# Patient Record
Sex: MALE | Race: WHITE | Employment: UNEMPLOYED | URBAN - METROPOLITAN AREA
[De-identification: names, ages, dates, MRNs, and addresses within clinical notes are randomized per-mention and may not be internally consistent; named-entity substitution may affect disease eponyms.]

---

## 2017-01-12 ENCOUNTER — OFFICE VISIT (OUTPATIENT)
Dept: FAMILY MEDICINE CLINIC | Facility: CLINIC | Age: 22
End: 2017-01-12

## 2017-01-12 VITALS
TEMPERATURE: 98 F | WEIGHT: 167 LBS | HEART RATE: 56 BPM | SYSTOLIC BLOOD PRESSURE: 111 MMHG | DIASTOLIC BLOOD PRESSURE: 71 MMHG | BODY MASS INDEX: 21 KG/M2

## 2017-01-12 DIAGNOSIS — J06.9 ACUTE URI: Primary | ICD-10-CM

## 2017-01-12 PROCEDURE — 99212 OFFICE O/P EST SF 10 MIN: CPT | Performed by: FAMILY MEDICINE

## 2017-01-12 PROCEDURE — 99213 OFFICE O/P EST LOW 20 MIN: CPT | Performed by: FAMILY MEDICINE

## 2017-01-12 RX ORDER — AZITHROMYCIN 250 MG/1
TABLET, FILM COATED ORAL
Qty: 6 TABLET | Refills: 0 | Status: SHIPPED | OUTPATIENT
Start: 2017-01-12 | End: 2018-04-06 | Stop reason: ALTCHOICE

## 2017-01-12 RX ORDER — ALBUTEROL SULFATE 90 UG/1
2 AEROSOL, METERED RESPIRATORY (INHALATION) EVERY 4 HOURS PRN
Qty: 1 INHALER | Refills: 0 | Status: SHIPPED | OUTPATIENT
Start: 2017-01-12 | End: 2018-04-06 | Stop reason: ALTCHOICE

## 2017-01-12 NOTE — PROGRESS NOTES
HPI:    Patient ID: Rupal Evans is a 25year old male. HPI  Patient presents with:  Cough: c/o cough and wheezing for 3 weeks    Review of Systems   Constitutional: Negative. HENT: Positive for congestion. Respiratory: Positive for cough.

## 2017-06-16 ENCOUNTER — OFFICE VISIT (OUTPATIENT)
Dept: OPTOMETRY | Facility: CLINIC | Age: 22
End: 2017-06-16

## 2017-06-16 DIAGNOSIS — H52.13 MYOPIA, BILATERAL: Primary | ICD-10-CM

## 2017-06-16 PROCEDURE — 92012 INTRM OPH EXAM EST PATIENT: CPT | Performed by: OPTOMETRIST

## 2017-06-16 PROCEDURE — 92015 DETERMINE REFRACTIVE STATE: CPT | Performed by: OPTOMETRIST

## 2017-06-16 NOTE — PROGRESS NOTES
Cleo Seen is a 23year old male. HPI:     HPI     Patient is in for an annual contact lens exam. Patient is happy with his Godfrey Proclear. Disposes of after one month of daily wear.        Last edited by Julienne Tripathi OD on 6/16/2017  8:16 AM. Near cc 4pt 4pt       Correction:  Glasses      Tonometry (Non-contact air puff, 8:24 AM)      Right Left   Pressure 20 19         Pupils      Pupils   Right PERRL   Left PERRL         Visual Fields      Left Right   Result Full Full         Extraocular prescriptions requested or ordered in this encounter     Follow up instructions:  Return for Eye Exam, contact lens check.     6/16/2017  Scribed by: Arely العلي

## 2017-08-10 ENCOUNTER — TELEPHONE (OUTPATIENT)
Dept: OPHTHALMOLOGY | Facility: CLINIC | Age: 22
End: 2017-08-10

## 2018-04-06 NOTE — PROGRESS NOTES
HPI:    Patient ID: Jamaal Waterman is a 23year old male. HPI  Patient presents with:  ADHD: pt would like to start medication, H/O ADHD  Knee Pain: c/o right knee pain for 2 weeks    Review of Systems   Constitutional: Negative.     Musculoskeletal: Po

## 2018-05-04 NOTE — PROGRESS NOTES
HPI:    Patient ID: Caryle Breeze is a 23year old male. HPI  Patient presents with: Follow - Up: Here today for 1 month follow up on ADD. Pt feels benefits do not outweigh the reactions he has been feeling such as increase anxiety.  Pt would like to

## 2018-06-18 ENCOUNTER — TELEPHONE (OUTPATIENT)
Dept: OPTOMETRY | Facility: CLINIC | Age: 23
End: 2018-06-18

## 2018-06-18 NOTE — TELEPHONE ENCOUNTER
pt called. LOV 6/16/17 with PPS. He made an appt for 7/19/18. Can he have enough contacts until this appt? Please advise  He can  the RX. Thank you.

## 2018-06-19 NOTE — TELEPHONE ENCOUNTER
Pt called stating he would like to  the contact rx. Tonight if possible or one night this week. Will someone be at the desk?   Please call pt to advise

## 2018-07-19 ENCOUNTER — OFFICE VISIT (OUTPATIENT)
Dept: OPTOMETRY | Facility: CLINIC | Age: 23
End: 2018-07-19
Payer: COMMERCIAL

## 2018-07-19 DIAGNOSIS — H52.13 MYOPIA OF BOTH EYES: Primary | ICD-10-CM

## 2018-07-19 PROCEDURE — 92015 DETERMINE REFRACTIVE STATE: CPT | Performed by: OPTOMETRIST

## 2018-07-19 PROCEDURE — 92012 INTRM OPH EXAM EST PATIENT: CPT | Performed by: OPTOMETRIST

## 2018-07-19 RX ORDER — DEXMETHYLPHENIDATE HYDROCHLORIDE 5 MG/1
5 CAPSULE, EXTENDED RELEASE ORAL DAILY
Refills: 0 | COMMUNITY
Start: 2018-05-04 | End: 2019-06-27

## 2018-07-20 NOTE — PROGRESS NOTES
Trinh Webster is a 26year old male. HPI:     HPI     Patient is in for an annual contact lens exam. States that his vision is not as clear in the left eye as the right. Patient is happy with his Godfrey Proclear lenses.  Disposes of after 1 month of da Extraocular Movement       Right Left     Full, Ortho Full, Ortho          Neuro/Psych     Oriented x3:  Yes    Mood/Affect:  Normal            Additional Tests     Amsler       Right Left     Normal Normal            Slit Lamp and Fundus Exam     External

## 2018-11-09 ENCOUNTER — OFFICE VISIT (OUTPATIENT)
Dept: INTERNAL MEDICINE CLINIC | Facility: CLINIC | Age: 23
End: 2018-11-09
Payer: COMMERCIAL

## 2018-11-09 VITALS
WEIGHT: 174 LBS | HEART RATE: 48 BPM | DIASTOLIC BLOOD PRESSURE: 80 MMHG | TEMPERATURE: 98 F | HEIGHT: 74.25 IN | SYSTOLIC BLOOD PRESSURE: 120 MMHG | BODY MASS INDEX: 22.1 KG/M2

## 2018-11-09 DIAGNOSIS — Z00.00 PHYSICAL EXAM: Primary | ICD-10-CM

## 2018-11-09 DIAGNOSIS — H18.823 DISORDER OF BOTH CORNEAS DUE TO CONTACT LENS: ICD-10-CM

## 2018-11-09 DIAGNOSIS — Z23 NEED FOR VACCINATION: ICD-10-CM

## 2018-11-09 DIAGNOSIS — H52.13 MYOPIA OF BOTH EYES: ICD-10-CM

## 2018-11-09 DIAGNOSIS — F90.0 ATTENTION DEFICIT HYPERACTIVITY DISORDER (ADHD), PREDOMINANTLY INATTENTIVE TYPE: ICD-10-CM

## 2018-11-09 PROCEDURE — 90686 IIV4 VACC NO PRSV 0.5 ML IM: CPT | Performed by: INTERNAL MEDICINE

## 2018-11-09 PROCEDURE — 99395 PREV VISIT EST AGE 18-39: CPT | Performed by: INTERNAL MEDICINE

## 2018-11-09 PROCEDURE — 93005 ELECTROCARDIOGRAM TRACING: CPT | Performed by: INTERNAL MEDICINE

## 2018-11-09 PROCEDURE — 93000 ELECTROCARDIOGRAM COMPLETE: CPT | Performed by: INTERNAL MEDICINE

## 2018-11-09 PROCEDURE — 90471 IMMUNIZATION ADMIN: CPT | Performed by: INTERNAL MEDICINE

## 2018-11-09 NOTE — PATIENT INSTRUCTIONS
1. Physical exam  Physical exam instruction: Improve diet and exercise, complete fasting labs in the near future and you will be called with results 5-7 days after completed, call with questions. - CBC WITH DIFFERENTIAL WITH PLATELET;  Future  - COMP MET

## 2018-11-09 NOTE — PROGRESS NOTES
Sakina Ha is a 26year old male who presents for a complete physical exam.   HPI:   Pt complains of:    Patient presents with:  Physical: establish care, [facility] and hopeful to [address], one med for ADHD spectrim, diet average, etoh social, exe heartbeat/palpitations. GI: Negative for Abdominal pain, constipation, diarrhea, heartburn, nausea and vomiting. : Negative for Dysuria and urinary frequency. Endocrine: Negative for Cold intolerance and heat intolerance.   Neuro: Negative for Gait dis Future  - URINALYSIS WITH CULTURE REFLEX  - VITAMIN D, 25-HYDROXY; Future  - VITAMIN B12; Future  - MAGNESIUM; Future  EKG: Sinus rhythm/bradycardia 48 bpm, no ST-T wave changes, normal EKG with the exception of rate    2.  Attention deficit hyperactivity d

## 2019-02-04 ENCOUNTER — LAB ENCOUNTER (OUTPATIENT)
Dept: LAB | Age: 24
End: 2019-02-04
Attending: INTERNAL MEDICINE
Payer: COMMERCIAL

## 2019-02-04 DIAGNOSIS — Z00.00 PHYSICAL EXAM: ICD-10-CM

## 2019-02-04 LAB
25(OH)D3 SERPL-MCNC: 22.6 NG/ML (ref 30–100)
ALBUMIN SERPL BCP-MCNC: 4.4 G/DL (ref 3.5–4.8)
ALBUMIN/GLOB SERPL: 1.6 {RATIO} (ref 1–2)
ALP SERPL-CCNC: 59 U/L (ref 32–100)
ALT SERPL-CCNC: 21 U/L (ref 17–63)
ANION GAP SERPL CALC-SCNC: 11 MMOL/L (ref 0–18)
AST SERPL-CCNC: 26 U/L (ref 15–41)
BASOPHILS # BLD AUTO: 0.04 X10(3) UL (ref 0–0.2)
BASOPHILS NFR BLD AUTO: 1 %
BILIRUB SERPL-MCNC: 1.5 MG/DL (ref 0.3–1.2)
BILIRUB UR QL: NEGATIVE
BUN SERPL-MCNC: 12 MG/DL (ref 8–20)
BUN/CREAT SERPL: 12.2 (ref 10–20)
CALCIUM SERPL-MCNC: 9.7 MG/DL (ref 8.5–10.5)
CHLORIDE SERPL-SCNC: 99 MMOL/L (ref 95–110)
CHOLEST SERPL-MCNC: 179 MG/DL (ref 110–200)
CLARITY UR: CLEAR
CO2 SERPL-SCNC: 27 MMOL/L (ref 22–32)
COLOR UR: YELLOW
CREAT SERPL-MCNC: 0.98 MG/DL (ref 0.5–1.5)
DEPRECATED RDW RBC AUTO: 39.7 FL (ref 35.1–46.3)
EOSINOPHIL # BLD AUTO: 0.1 X10(3) UL (ref 0–0.7)
EOSINOPHIL NFR BLD AUTO: 2.5 %
ERYTHROCYTE [DISTWIDTH] IN BLOOD BY AUTOMATED COUNT: 12.1 % (ref 11–15)
GLOBULIN PLAS-MCNC: 2.7 G/DL (ref 2.5–3.7)
GLUCOSE SERPL-MCNC: 94 MG/DL (ref 70–99)
GLUCOSE UR-MCNC: NEGATIVE MG/DL
HCT VFR BLD AUTO: 45.8 % (ref 39–53)
HDLC SERPL-MCNC: 84 MG/DL
HGB BLD-MCNC: 15.4 G/DL (ref 13–17.5)
HGB UR QL STRIP.AUTO: NEGATIVE
IMM GRANULOCYTES # BLD AUTO: 0.01 X10(3) UL (ref 0–1)
IMM GRANULOCYTES NFR BLD: 0.2 %
KETONES UR-MCNC: NEGATIVE MG/DL
LDLC SERPL CALC-MCNC: 85 MG/DL (ref 0–99)
LEUKOCYTE ESTERASE UR QL STRIP.AUTO: NEGATIVE
LYMPHOCYTES # BLD AUTO: 1.58 X10(3) UL (ref 1–4)
LYMPHOCYTES NFR BLD AUTO: 39.4 %
MAGNESIUM SERPL-MCNC: 2 MG/DL (ref 1.8–2.5)
MCH RBC QN AUTO: 30.1 PG (ref 26–34)
MCHC RBC AUTO-ENTMCNC: 33.6 G/DL (ref 31–37)
MCV RBC AUTO: 89.5 FL (ref 80–100)
MONOCYTES # BLD AUTO: 0.43 X10(3) UL (ref 0.1–1)
MONOCYTES NFR BLD AUTO: 10.7 %
NEUTROPHILS # BLD AUTO: 1.85 X10 (3) UL (ref 1.5–7.7)
NEUTROPHILS # BLD AUTO: 1.85 X10(3) UL (ref 1.5–7.7)
NEUTROPHILS NFR BLD AUTO: 46.2 %
NITRITE UR QL STRIP.AUTO: NEGATIVE
NONHDLC SERPL-MCNC: 95 MG/DL
OSMOLALITY UR CALC.SUM OF ELEC: 284 MOSM/KG (ref 275–295)
PATIENT FASTING: YES
PH UR: 7 [PH] (ref 5–8)
PLATELET # BLD AUTO: 242 10(3)UL (ref 150–450)
POTASSIUM SERPL-SCNC: 4.6 MMOL/L (ref 3.3–5.1)
PROT SERPL-MCNC: 7.1 G/DL (ref 5.9–8.4)
PROT UR-MCNC: NEGATIVE MG/DL
RBC # BLD AUTO: 5.12 X10(6)UL (ref 4.3–5.7)
SODIUM SERPL-SCNC: 137 MMOL/L (ref 136–144)
SP GR UR STRIP: 1.02 (ref 1–1.03)
TRIGL SERPL-MCNC: 49 MG/DL (ref 1–149)
TSH SERPL-ACNC: 1.52 UIU/ML (ref 0.45–5.33)
UROBILINOGEN UR STRIP-ACNC: <2
VIT B12 SERPL-MCNC: 406 PG/ML (ref 181–914)
VIT C UR-MCNC: NEGATIVE MG/DL
WBC # BLD AUTO: 4 X10(3) UL (ref 4–11)

## 2019-02-04 PROCEDURE — 81003 URINALYSIS AUTO W/O SCOPE: CPT | Performed by: INTERNAL MEDICINE

## 2019-02-04 PROCEDURE — 80053 COMPREHEN METABOLIC PANEL: CPT

## 2019-02-04 PROCEDURE — 84443 ASSAY THYROID STIM HORMONE: CPT

## 2019-02-04 PROCEDURE — 36415 COLL VENOUS BLD VENIPUNCTURE: CPT

## 2019-02-04 PROCEDURE — 82306 VITAMIN D 25 HYDROXY: CPT

## 2019-02-04 PROCEDURE — 80061 LIPID PANEL: CPT

## 2019-02-04 PROCEDURE — 82607 VITAMIN B-12: CPT

## 2019-02-04 PROCEDURE — 85025 COMPLETE CBC W/AUTO DIFF WBC: CPT

## 2019-02-04 PROCEDURE — 83735 ASSAY OF MAGNESIUM: CPT

## 2019-02-15 ENCOUNTER — TELEPHONE (OUTPATIENT)
Dept: OPTOMETRY | Facility: CLINIC | Age: 24
End: 2019-02-15

## 2019-02-18 ENCOUNTER — TELEPHONE (OUTPATIENT)
Dept: INTERNAL MEDICINE CLINIC | Facility: CLINIC | Age: 24
End: 2019-02-18

## 2019-02-18 NOTE — TELEPHONE ENCOUNTER
LMTCB    Per [facility], pt received [employer] message 2/15/19 regarding lab results; RN to verify if pt received [employer] message.

## 2019-02-18 NOTE — TELEPHONE ENCOUNTER
Pt asked that Dr Tracye Adams call him to discuss results of recent labs and next steps  Tasked to nursing

## 2019-02-18 NOTE — TELEPHONE ENCOUNTER
Left lens is especially dry. He wears a monthly replacement Proclear. I will get a trial pair of Acuvue Oasys 14.0/8.8 for patient to try.

## 2019-02-19 ENCOUNTER — PATIENT MESSAGE (OUTPATIENT)
Dept: INTERNAL MEDICINE CLINIC | Facility: CLINIC | Age: 24
End: 2019-02-19

## 2019-02-20 NOTE — TELEPHONE ENCOUNTER
Information for [employer] mailed to patient .  [facility] message written with Information about Antione Harding

## 2019-02-20 NOTE — TELEPHONE ENCOUNTER
From: Art Dominguez  To: Miriam Meza DO  Sent: 2/19/2019 8:49 PM CST  Subject: Visit Melita Santi Urrutia,    Could you please send me the contact information/referral for the [facility] group once and Deshawn Cardenas more?   I no

## 2019-02-22 NOTE — TELEPHONE ENCOUNTER
Called patient who received  [facility] message with lab results and [employer] information

## 2019-05-06 ENCOUNTER — TELEPHONE (OUTPATIENT)
Dept: OPHTHALMOLOGY | Facility: CLINIC | Age: 24
End: 2019-05-06

## 2019-05-09 ENCOUNTER — TELEPHONE (OUTPATIENT)
Dept: INTERNAL MEDICINE CLINIC | Facility: CLINIC | Age: 24
End: 2019-05-09

## 2019-05-09 RX ORDER — CLOTRIMAZOLE AND BETAMETHASONE DIPROPIONATE 10; .64 MG/G; MG/G
1 CREAM TOPICAL 2 TIMES DAILY
Qty: 45 G | Refills: 0 | Status: SHIPPED | OUTPATIENT
Start: 2019-05-09 | End: 2019-05-23

## 2019-05-09 NOTE — TELEPHONE ENCOUNTER
Pt requesting in apt with  possible Monday after 1pm he is not sure what it is but looks like a ring worm on his thigh.

## 2019-05-09 NOTE — TELEPHONE ENCOUNTER
Spoke with patient and relayed message from Dr. Jhon Yen. Patient verbalized understanding of instructions and agreement with plan. Pharmacy verified. Med eRx'd.

## 2019-05-09 NOTE — TELEPHONE ENCOUNTER
Spoke with patient. He reports two small rash spots to L thigh about the size of an eraser head each. He reports he has had rash x about 1 month.  Redness and size improved with old antifungal cream (leftover from when he was in high school and had a simila

## 2019-06-27 PROBLEM — M76.891 HAMSTRING TENDONITIS OF RIGHT THIGH: Status: ACTIVE | Noted: 2019-06-27

## 2019-06-27 PROBLEM — F33.8 SEASONAL DEPRESSION (HCC): Status: ACTIVE | Noted: 2019-06-27

## 2019-06-27 PROBLEM — F41.1 GENERALIZED ANXIETY DISORDER: Status: ACTIVE | Noted: 2019-06-27

## 2019-06-27 NOTE — PATIENT INSTRUCTIONS
1. Generalized anxiety disorder  Cont meds and management, stay with the specialists, stay with the every 3-month visits for now, and see me in 6 months for a follow-up,    2.  Seasonal depression ([facility] [address]  No changes here

## 2019-06-27 NOTE — PROGRESS NOTES
HPI:   Daphne Henson is a 27year old male who presents for complains of: Patient presents with: Anxiety: Follow up after [facility] appt. Now on bupropion. Was given propranolol but did not help. Has it on hand to take just as needed.  Pt undiagnosed with

## 2020-01-06 ENCOUNTER — TELEPHONE (OUTPATIENT)
Dept: INTERNAL MEDICINE CLINIC | Facility: CLINIC | Age: 25
End: 2020-01-06

## 2020-01-06 NOTE — TELEPHONE ENCOUNTER
Pt is calling today to se if Dr GOOD will \"take over\" his Wellbutrin, Pt states he has talked to Dr GOOD about having him take over his medication the start of the year so pt no longer has to get it from his psychiatrist.     Pt was informed he should set up a

## 2020-01-06 NOTE — TELEPHONE ENCOUNTER
Spoke to patient who thanks Dr. Rodolfo Preston for filling his medication.  I requested he reads off his prescription from the bottle to me so that we send in the correct dosage/form, etc. He reports he does not have it in front of him so he will call back tomorrow with

## 2020-01-06 NOTE — TELEPHONE ENCOUNTER
To Dr. Patten Pay to advise on \"taking over\" medication, thanks! Last OV 6/27/19:  \"1.  Generalized anxiety disorder  Cont meds and management, stay with the specialists, stay with the every 3-month visits for now, and see me in 6 months for a follow-up,\"

## 2020-01-08 NOTE — TELEPHONE ENCOUNTER
Pt. Called back to have Rx sent to [facility] in [address] ph. # [phone] Pt.  Is aware Dr. Alexi Reyes is off today

## 2020-01-09 RX ORDER — BUPROPION HYDROCHLORIDE 300 MG/1
300 TABLET ORAL DAILY
Qty: 30 TABLET | Refills: 5 | Status: SHIPPED | OUTPATIENT
Start: 2020-01-09 | End: 2020-05-21

## 2020-01-09 NOTE — TELEPHONE ENCOUNTER
Mariela---can you please confirm that right medication was pended?   It also looks like a PA will be needed

## 2020-01-22 ENCOUNTER — TELEPHONE (OUTPATIENT)
Dept: INTERNAL MEDICINE CLINIC | Facility: CLINIC | Age: 25
End: 2020-01-22

## 2020-01-22 NOTE — TELEPHONE ENCOUNTER
Pt asked that Dr Rosy Renee please call him   He is having trouble scheduling an appointment with Dr Rosy Renee that works with his school schedule.   He goes to [facility] and [place] the city now  He wanted to talk to Dr Rosy Renee about managing his Bupropion and also about some chronic knee pain  Tasked to nursing for Dr Rosy Renee

## 2020-01-23 NOTE — TELEPHONE ENCOUNTER
To Dr. Aysha Erazo - see below. Pt states bupropion seems to be at the right level. Will you refill without pt being seen? Pt states chronic intermittent R knee pain (back of knee) - has had PT. Has not tried any OTC products. Pt states very difficult to come for appt - pt call transferred to  for appt.

## 2020-05-21 ENCOUNTER — OFFICE VISIT (OUTPATIENT)
Dept: INTERNAL MEDICINE CLINIC | Facility: CLINIC | Age: 25
End: 2020-05-21
Payer: COMMERCIAL

## 2020-05-21 VITALS
BODY MASS INDEX: 22 KG/M2 | SYSTOLIC BLOOD PRESSURE: 122 MMHG | TEMPERATURE: 99 F | DIASTOLIC BLOOD PRESSURE: 88 MMHG | WEIGHT: 170 LBS | HEART RATE: 76 BPM | OXYGEN SATURATION: 99 %

## 2020-05-21 DIAGNOSIS — M76.891 HAMSTRING TENDONITIS OF RIGHT THIGH: ICD-10-CM

## 2020-05-21 DIAGNOSIS — F33.8 SEASONAL DEPRESSION (HCC): ICD-10-CM

## 2020-05-21 DIAGNOSIS — F41.1 GENERALIZED ANXIETY DISORDER: ICD-10-CM

## 2020-05-21 DIAGNOSIS — M25.561 RECURRENT PAIN OF RIGHT KNEE: Primary | ICD-10-CM

## 2020-05-21 DIAGNOSIS — Z00.00 PHYSICAL EXAM: ICD-10-CM

## 2020-05-21 PROCEDURE — 3079F DIAST BP 80-89 MM HG: CPT | Performed by: INTERNAL MEDICINE

## 2020-05-21 PROCEDURE — 3074F SYST BP LT 130 MM HG: CPT | Performed by: INTERNAL MEDICINE

## 2020-05-21 PROCEDURE — 99395 PREV VISIT EST AGE 18-39: CPT | Performed by: INTERNAL MEDICINE

## 2020-05-21 RX ORDER — BUPROPION HYDROCHLORIDE 300 MG/1
300 TABLET ORAL DAILY
Qty: 30 TABLET | Refills: 5 | Status: SHIPPED | OUTPATIENT
Start: 2020-05-21 | End: 2020-10-27

## 2020-05-21 NOTE — PATIENT INSTRUCTIONS
1. Recurrent pain of right knee  Ok to get back to the PT moves and back to working out  R knee xr if it still bothers you in a few weeks  Okay to use any anti-inflammatories if it is acting up  Remember possibly wearing the neoprene knee sleeve to keep th

## 2020-05-21 NOTE — PROGRESS NOTES
Nidia Reynaga is a 27year old male who presents for a complete physical exam.   HPI:   Pt complains of:    Patient presents with:  Knee Pain: Pt presents for right back of knee pain. Injured it 4 years ago, had MRI and PT at that time.  Recently was isabel fever, malaise, weight gain and weight loss. ENMT: Negative for Nasal drainage and sinus pressure. Eyes: Negative for Vision changes. Respiratory: Negative for Cough, dyspnea and wheezing.   Cardio: Negative Chest pain and irregular heartbeat/palpitation on the right side as compared to the left, no palpable pain, good musculature, well-balanced VMO    ASSESSMENT AND PLAN:   Jamaal Waterman is a 27year old male who presents for a complete physical exam.  1. Recurrent pain of right knee  Ok to get back to

## 2020-08-03 ENCOUNTER — TELEPHONE (OUTPATIENT)
Dept: OPTOMETRY | Facility: CLINIC | Age: 25
End: 2020-08-03

## 2020-08-03 NOTE — TELEPHONE ENCOUNTER
Patient has a small stye on his JAYSON for the past 3 days. I recommend WC to both lids in the AM to clean oil and debris and then WC to the left eye 5-6 times a day until it resolves. Wearing glasses recommended. NO contacts until resolved.

## 2020-10-21 ENCOUNTER — TELEPHONE (OUTPATIENT)
Dept: INTERNAL MEDICINE CLINIC | Facility: CLINIC | Age: 25
End: 2020-10-21

## 2020-10-21 NOTE — TELEPHONE ENCOUNTER
Attempted to call patient to gain more information on brain fog symptoms. No answer and VM box full so unable to LMTCB. Nursing to try again later.

## 2020-10-21 NOTE — TELEPHONE ENCOUNTER
Spoke to patient who reports \"brain fog\" symptoms include becoming a little more forgetful, \"dont feel very sharp\", not thinking straight, a \"little unmotivated\", difficulty learning some things.  Patient reports he had these symptoms when he initiall

## 2020-10-21 NOTE — TELEPHONE ENCOUNTER
Pt. Called stating he is currently taking generic Welbutrin 300mgs  Tabs and he takes 1 daily. Pt. Is asking if he can increase his dosage?   He is much better than he was when he first saw Dr. Kellie Shah. for this issue, but he states he feels  Some \"brain fog\"

## 2020-10-22 NOTE — TELEPHONE ENCOUNTER
Patient calling for update---PCP out of office indefinitely at this current time.  To on-call physician (Dr. Sujatha Blackburn) to please advise in Dr. Kassi Pereira absence on below message:

## 2020-10-23 NOTE — TELEPHONE ENCOUNTER
Telephone call to patient and situation discussed. Patient feels that he is doing better than he was when he had his initial problem in 2018. Patient feels that he is sleeping a little bit right now compared to how he was feeling.   He realizes that there

## 2020-10-26 NOTE — TELEPHONE ENCOUNTER
[address] [facility] desk, try to reach out to him and get him on my schedule sometime this week virtual visit is the only thing available

## 2020-10-27 NOTE — TELEPHONE ENCOUNTER
Tried to call patient to help him schedule a video visit with Dr Derrick Keita per message below. Patient not answer, mailbox full.      Number called at this time: [phone]

## 2020-10-27 NOTE — PROGRESS NOTES
Virtual Visit/Telephone Note    Cleo Seen verbally consents to a Virtual/Telephone Check-In service on 04/07/20. Patient understands and accepts financial responsibility for any deductible, co-insurance and/or co-pays associated with this service. the behavioral health navigator, lets get you a counselor that can  your mood over the next few weeks as the medications change  We did discuss the possibility of changing over to an SSRI like Cymbalta, as well as a stimulant like Vyvanse together as

## 2021-01-30 ENCOUNTER — TELEPHONE (OUTPATIENT)
Dept: INTERNAL MEDICINE CLINIC | Facility: CLINIC | Age: 26
End: 2021-01-30

## 2021-01-30 DIAGNOSIS — F41.1 GENERALIZED ANXIETY DISORDER: ICD-10-CM

## 2021-02-01 NOTE — TELEPHONE ENCOUNTER
Sami Mosley,  We received a request for bupropion for refill.    Could you message us back on if you are still taking this, how you are taking, and if you have been talking to a counselor since your visit with Alma Boothe?  Thank you,  YOSEPH

## 2021-02-02 ENCOUNTER — OFFICE VISIT (OUTPATIENT)
Dept: OPTOMETRY | Facility: CLINIC | Age: 26
End: 2021-02-02
Payer: COMMERCIAL

## 2021-02-02 DIAGNOSIS — H52.13 MYOPIA OF BOTH EYES: Primary | ICD-10-CM

## 2021-02-02 PROCEDURE — 92012 INTRM OPH EXAM EST PATIENT: CPT | Performed by: OPTOMETRIST

## 2021-02-02 RX ORDER — BUPROPION HYDROCHLORIDE 150 MG/1
450 TABLET ORAL DAILY
COMMUNITY
Start: 2020-12-27 | End: 2021-02-12 | Stop reason: ALTCHOICE

## 2021-02-02 NOTE — PROGRESS NOTES
Omaira Parry is a 23year old male. HPI:     HPI     Patient is in for an annual contact lens exam. He has no complaints with his vision and is happy with his Oasys contact lenses. He broke his current glasses and wants to get an RX for a new pair. Frutoso Sour on 2/2/2021  8:51 AM. (History)          PHYSICAL EXAM:     Base Eye Exam     Visual Acuity (Snellen - Linear)       Right Left    Dist cc 20/20 20/20    Near cc 4pt 4pt    Correction: Contacts          Tonometry ([facility], 8:58 AM)       Right Left and Plan:     Myopia  New glasses and contact lens RX given today. No orders of the defined types were placed in this encounter.       Meds This Visit:  Requested Prescriptions      No prescriptions requested or ordered in this encounter        Follow

## 2021-02-12 ENCOUNTER — TELEPHONE (OUTPATIENT)
Dept: INTERNAL MEDICINE CLINIC | Facility: CLINIC | Age: 26
End: 2021-02-12

## 2021-02-12 RX ORDER — BUPROPION HYDROCHLORIDE 150 MG/1
TABLET ORAL
Qty: 90 TABLET | Refills: 1 | Status: SHIPPED | OUTPATIENT
Start: 2021-02-12 | End: 2021-03-01

## 2021-02-12 NOTE — TELEPHONE ENCOUNTER
To Melo Lemus---    Are you able to assist? Pt called back with the following:  \"              Pt called asking for refill on Wellbutrin  pls send to [facility] in [facility]  Pt wanted to let Dr Zbigniew Atkinson the dosage is working well.   Pt can be reached at [phone]

## 2021-02-12 NOTE — TELEPHONE ENCOUNTER
To  - Bupropion pended for review;   Pt states it's working;  Did not indicate if he has reached out to psych yet

## 2021-02-12 NOTE — TELEPHONE ENCOUNTER
Pt called asking for refill on Wellbutrin  [facility]s [facility]  Pt wanted to let Dr Jian Yao the dosage is working well.   Pt can be reached at [phone]

## 2021-03-01 RX ORDER — BUPROPION HYDROCHLORIDE 150 MG/1
450 TABLET ORAL DAILY
Qty: 90 TABLET | Refills: 1 | Status: SHIPPED | OUTPATIENT
Start: 2021-03-01 | End: 2021-04-28

## 2021-03-01 NOTE — TELEPHONE ENCOUNTER
Spoke with [facility]- they did not receive rx from 2/12/21, last received in October. Resent RX as previously sent by MD.     Patient notified via my chart.

## 2021-03-01 NOTE — TELEPHONE ENCOUNTER
Patient calling, spoke to [place] today. [facility] has not received refill request that was sent 2/12/2021. Patient is completely medication. Please call patient back.   Best call back number [phone]

## 2021-03-29 NOTE — TELEPHONE ENCOUNTER
Pt used the trial contacts he was given and would now like to order more contacts. Does not remember the name, but would like to order the same type as the trial pack. Reviewed with Dr. Rangel. VORB  Dr. Matos:   Alert consistent with PAT/AVNRT. Will continue to monitor at this time. Pt to call if any worsening symptoms.

## 2021-04-19 ENCOUNTER — IMMUNIZATION (OUTPATIENT)
Dept: LAB | Age: 26
End: 2021-04-19
Attending: HOSPITALIST
Payer: COMMERCIAL

## 2021-04-19 DIAGNOSIS — Z23 NEED FOR VACCINATION: Primary | ICD-10-CM

## 2021-04-19 PROCEDURE — 0001A SARSCOV2 VAC 30MCG/0.3ML IM: CPT

## 2021-04-27 DIAGNOSIS — F41.1 GENERALIZED ANXIETY DISORDER: ICD-10-CM

## 2021-04-28 RX ORDER — BUPROPION HYDROCHLORIDE 150 MG/1
TABLET ORAL
Qty: 90 TABLET | Refills: 1 | Status: SHIPPED | OUTPATIENT
Start: 2021-04-28 | End: 2021-08-03

## 2021-05-10 ENCOUNTER — IMMUNIZATION (OUTPATIENT)
Dept: LAB | Age: 26
End: 2021-05-10
Attending: HOSPITALIST
Payer: COMMERCIAL

## 2021-05-10 DIAGNOSIS — Z23 NEED FOR VACCINATION: Primary | ICD-10-CM

## 2021-05-10 PROCEDURE — 0002A SARSCOV2 VAC 30MCG/0.3ML IM: CPT

## 2021-06-02 ENCOUNTER — TELEPHONE (OUTPATIENT)
Dept: INTERNAL MEDICINE CLINIC | Facility: CLINIC | Age: 26
End: 2021-06-02

## 2021-06-02 NOTE — TELEPHONE ENCOUNTER
Please advise - called patient who injured right knee about 8 years ago , he saw DR. Laboy then . Now his knee is still hurting , more lately in the back and exterior right side - pain and burns .  Should he be seen here or shoul dhe see specialist right

## 2021-06-02 NOTE — TELEPHONE ENCOUNTER
Patient is calling stating that his right knee hurts. The back and the exterior right side is what hurts. It hurts and burns. There is constant pain but it hurts more with activity.         He is not sure if he needs to see a specialist.    Please call an

## 2021-06-03 NOTE — TELEPHONE ENCOUNTER
Spoke to pt and relayed MD message and instructions. Pt will contact Dr. Esvin Hirsch' office. Advised to contact us if he needs any assistance, or if he decides that he would like to see Dr. Katy Quiroga first. Pt verbalized understanding and agrees with plan.

## 2021-06-03 NOTE — TELEPHONE ENCOUNTER
Dont know, up to him, but if he does have an established relationship with the specialist he should probably start there.

## 2021-08-03 DIAGNOSIS — F41.1 GENERALIZED ANXIETY DISORDER: ICD-10-CM

## 2021-08-04 NOTE — TELEPHONE ENCOUNTER
Patient was called per request below. Patient answered and was informed he is due for an annual physical with Dr Eamon Rodriguez. Patient verbalized understanding but stated he'd have to call back to schedule at a later time.  Patient was informed that the [facility]

## 2021-08-05 RX ORDER — BUPROPION HYDROCHLORIDE 150 MG/1
450 TABLET ORAL DAILY
Qty: 90 TABLET | Refills: 0 | Status: SHIPPED | OUTPATIENT
Start: 2021-08-05 | End: 2021-09-23

## 2021-08-05 NOTE — TELEPHONE ENCOUNTER
Patient had virtual visit with Babar Baker on 10/27/2020 and was last in office on 5/21/2020.  Refill sent for 30-days     Requested Prescriptions     Signed Prescriptions Disp Refills   • buPROPion 150 MG Oral Tablet 24 Hr 90 tablet 0     Sig: Take 3 tablets

## 2021-08-31 ENCOUNTER — OFFICE VISIT (OUTPATIENT)
Dept: INTERNAL MEDICINE CLINIC | Facility: CLINIC | Age: 26
End: 2021-08-31
Payer: COMMERCIAL

## 2021-08-31 VITALS
TEMPERATURE: 99 F | WEIGHT: 172.38 LBS | SYSTOLIC BLOOD PRESSURE: 120 MMHG | DIASTOLIC BLOOD PRESSURE: 70 MMHG | BODY MASS INDEX: 21.66 KG/M2 | HEART RATE: 87 BPM | HEIGHT: 74.7 IN | OXYGEN SATURATION: 99 %

## 2021-08-31 DIAGNOSIS — F41.1 GENERALIZED ANXIETY DISORDER: ICD-10-CM

## 2021-08-31 DIAGNOSIS — F33.8 SEASONAL DEPRESSION (HCC): ICD-10-CM

## 2021-08-31 DIAGNOSIS — Z00.00 PHYSICAL EXAM: Primary | ICD-10-CM

## 2021-08-31 DIAGNOSIS — M25.561 ACUTE PAIN OF RIGHT KNEE: ICD-10-CM

## 2021-08-31 DIAGNOSIS — H52.13 MYOPIA OF BOTH EYES: ICD-10-CM

## 2021-08-31 PROBLEM — M76.891 HAMSTRING TENDONITIS OF RIGHT THIGH: Status: RESOLVED | Noted: 2019-06-27 | Resolved: 2021-08-31

## 2021-08-31 PROCEDURE — 3008F BODY MASS INDEX DOCD: CPT | Performed by: INTERNAL MEDICINE

## 2021-08-31 PROCEDURE — 3074F SYST BP LT 130 MM HG: CPT | Performed by: INTERNAL MEDICINE

## 2021-08-31 PROCEDURE — 99395 PREV VISIT EST AGE 18-39: CPT | Performed by: INTERNAL MEDICINE

## 2021-08-31 PROCEDURE — 3078F DIAST BP <80 MM HG: CPT | Performed by: INTERNAL MEDICINE

## 2021-08-31 NOTE — PATIENT INSTRUCTIONS
1. Physical exam  Physical exam instruction: Improve diet and exercise, complete fasting labs in the near future and you will be called with results 5-7 days after completed, call with questions.   Call the central scheduling number at [phone] to sched

## 2021-08-31 NOTE — PROGRESS NOTES
Omaira Parry is a 27year old male who presents for a complete physical exam.   HPI:   Pt complains of:    Patient presents with:  Physical: applying for jobs, graduating colleges. diet is good, working part time for extra cash.  EToh is every 2 wks, no loss.  ENMT: Negative for Nasal drainage and sinus pressure. Eyes: Negative for Vision changes. Respiratory: Negative for Cough, dyspnea and wheezing. Cardio: Negative Chest pain and irregular heartbeat/palpitations.   GI: Negative for Abdominal pain, co prostate not examined    ASSESSMENT AND PLAN:   Omaira Parry is a 27year old male who presents for a complete physical exam.  1. Physical exam  Physical exam instruction: Improve diet and exercise, complete fasting labs in the near future and you will

## 2021-09-21 ENCOUNTER — TELEPHONE (OUTPATIENT)
Dept: INTERNAL MEDICINE CLINIC | Facility: CLINIC | Age: 26
End: 2021-09-21

## 2021-09-21 DIAGNOSIS — F41.1 GENERALIZED ANXIETY DISORDER: ICD-10-CM

## 2021-09-21 NOTE — TELEPHONE ENCOUNTER
Patient calling for refill of Bupropion asking if Dr. Mata Ontiveros would give 2 months worth. Completely out of medication.      [employer]

## 2021-09-22 DIAGNOSIS — F41.1 GENERALIZED ANXIETY DISORDER: ICD-10-CM

## 2021-09-23 RX ORDER — BUPROPION HYDROCHLORIDE 150 MG/1
TABLET ORAL
Qty: 90 TABLET | Refills: 0 | Status: SHIPPED | OUTPATIENT
Start: 2021-09-23 | End: 2021-11-04

## 2021-09-23 NOTE — TELEPHONE ENCOUNTER
Pt was recently seen for physical, however overdue for fasting labs. Orders in system. Short fill given see refill encounter.

## 2021-09-23 NOTE — TELEPHONE ENCOUNTER
Pt was recently seen for physical, however overdue for fasting labs. Orders in system. Notation to pharmacy. Short fill given.      Refill request is for a maintenance medication and has met the criteria specified in the Ambulatory Medication Refill Standin

## 2021-10-24 DIAGNOSIS — F41.1 GENERALIZED ANXIETY DISORDER: ICD-10-CM

## 2021-10-28 ENCOUNTER — LAB ENCOUNTER (OUTPATIENT)
Dept: LAB | Age: 26
End: 2021-10-28
Attending: INTERNAL MEDICINE
Payer: COMMERCIAL

## 2021-10-28 DIAGNOSIS — Z00.00 PHYSICAL EXAM: ICD-10-CM

## 2021-10-28 PROCEDURE — 84402 ASSAY OF FREE TESTOSTERONE: CPT

## 2021-10-28 PROCEDURE — 85025 COMPLETE CBC W/AUTO DIFF WBC: CPT

## 2021-10-28 PROCEDURE — 84403 ASSAY OF TOTAL TESTOSTERONE: CPT

## 2021-10-28 PROCEDURE — 84443 ASSAY THYROID STIM HORMONE: CPT

## 2021-10-28 PROCEDURE — 80061 LIPID PANEL: CPT

## 2021-10-28 PROCEDURE — 36415 COLL VENOUS BLD VENIPUNCTURE: CPT

## 2021-10-28 PROCEDURE — 80053 COMPREHEN METABOLIC PANEL: CPT

## 2021-10-28 PROCEDURE — 82306 VITAMIN D 25 HYDROXY: CPT

## 2021-10-28 PROCEDURE — 81003 URINALYSIS AUTO W/O SCOPE: CPT | Performed by: INTERNAL MEDICINE

## 2021-11-04 RX ORDER — BUPROPION HYDROCHLORIDE 150 MG/1
TABLET ORAL
Qty: 90 TABLET | Refills: 5 | Status: SHIPPED | OUTPATIENT
Start: 2021-11-04

## 2021-11-04 NOTE — TELEPHONE ENCOUNTER
Patient calling for refill. Will be completely out after today. Did labs 10/28/2021. Routed low to Rx.

## 2021-11-04 NOTE — TELEPHONE ENCOUNTER
Noted, refill sent. Refill request is for a maintenance medication and has met the criteria specified in the Ambulatory Medication Refill Standing Order for eligibility, visits, laboratory, alerts and was sent to the requested pharmacy.     Requested Pre

## 2022-02-04 ENCOUNTER — TELEPHONE (OUTPATIENT)
Dept: OPHTHALMOLOGY | Facility: CLINIC | Age: 27
End: 2022-02-04

## 2022-02-04 NOTE — TELEPHONE ENCOUNTER
Patient will be scheduling an appointment with Dr. Quan Hickman in April, however his CL Rx  yesterday. I spoke to patient and explained that we can extend his CL Rx for 6 months and then he should follow-up with Dr. Quan Hickman. CL Rx mailed to patient.

## 2022-02-04 NOTE — TELEPHONE ENCOUNTER
Former pt of Dr. Aniceto Lee asking for a new contact script last seen Dr. Aniceto Lee 2/2/21 and was given new script said on last pair of contact and can't wait until April  please advise

## 2022-05-31 DIAGNOSIS — F41.1 GENERALIZED ANXIETY DISORDER: ICD-10-CM

## 2022-06-01 RX ORDER — BUPROPION HYDROCHLORIDE 150 MG/1
TABLET ORAL
Qty: 90 TABLET | Refills: 5 | Status: SHIPPED | OUTPATIENT
Start: 2022-06-01

## 2022-09-16 DIAGNOSIS — F41.1 GENERALIZED ANXIETY DISORDER: ICD-10-CM

## 2022-09-16 RX ORDER — BUPROPION HYDROCHLORIDE 150 MG/1
TABLET ORAL
Qty: 270 TABLET | Refills: 1 | OUTPATIENT
Start: 2022-09-16

## 2023-01-27 NOTE — TELEPHONE ENCOUNTER
Noted, I will refill the Wellbutrin, nursing please confirm the order, dosage, type of Wellbutrin, and send in a 30-day prescription with 5 refills. PT and OT

## (undated) NOTE — MR AVS SNAPSHOT
Margie  [address]  [phone]               Thank you for choosing us for your health care visit with [facility], .   We are glad to serve you and happy to provide you with this summary of discharge instructions in [facility] by going to Visits < Admission Summaries. If you've been to the Emergency Department or your doctor's office, you can view your past visit information in [facility] by going to Visits < Visit Summaries. [facility] questions?

## (undated) NOTE — MR AVS SNAPSHOT
[facility] SPECIALTY [facility] [address] [address] [place]  [id number] [phone]               Thank you for choosing us for your health care visit with Dano Robertson DO.   We are glad to serve you and happy to provide you with this summary of - azithromycin 250 MG Tabs            MyChart                  Visit [facility] online at  [email or web address]tn